# Patient Record
(demographics unavailable — no encounter records)

---

## 2025-03-18 NOTE — HISTORY OF PRESENT ILLNESS
[2] : 2 [1] : 2 [Part time] : Work status: part time [de-identified] : 11/1/22 The patient is a 55 year old female who presents today complaining of low back pain Date of Injury/Onset:  couple of weeks Pain:    At Rest:  5/10  With Activity:  4/10  Mechanism of injury:  onset Quality of symptoms: sharp stabbing Improves with:  meds Worse with:  weather Prior treatment:  GP Prior Imaging:  xray [facility] Additional Information: None  2-3 weeks of LBP.  Stabbing pain in lower back pain.  No pain radiating down legs.  Pain is worst at tailbone.  Seen by HER NP who ordered X-rays and prescribed naprosyn 500 md BID with some relief.  NO numbness, weakness, or tingling in legs.  NO fevers or chills.  No bowel or bladder symptoms.   PMH: High cholesterol PSH: None All: Penicillin  12/13/22  Pain greatly improved with PT.  Takes intermittent tylenol for relief at times.    03/18/25: Patient here today with lower back pain. Reports to having pain since 02/14/25, no specific cause of injury patient states she bent wrongly. Taking Advil if needed, experiencing stiffness in her lower back as well as numbness/tingling in her right foot. Reports to seeing a foot doctor in [place].  Restarted HEP     [] : no [FreeTextEntry1] : L-spine  [de-identified] : PT- 1-2x a week

## 2025-03-18 NOTE — IMAGING
[de-identified] : Spine: Inspection/Palpation: No tenderness to palpation throughout Cervical/thoracic/lumbar spine. No bony stepoffs, No lesions.  Gait: Non-antalgic, able to perform bilateral toe and heel rise.  Able to perform tandem gait.    Range of Motion: Lumbar Spine: Flexion to 90 degrees, Extension to 30 degrees, rotation 30 degrees bilaterally, lateral flexion to 30 degrees bilaterally.  Neurologic:  Bilateral Lower Extremities 5/5 Iliopsoas/Quadriceps/Hamstrings/ Tibialis Anterior/ Gastrocnemius. Extensor Hallucis Longus/ Flexor Hallucis Longus except    Sensation intact to light touch L2-S1  Patellar/ Achilles reflex within normal limits.   Negative straight leg raise  No pain with IR/ER/Flexion of HIps bilaterally   X-ray Ap/Lateral and obliques of lumbar spine from today were viewed and interpreted.  L4-5 spondylolisthesis   X-ray Ap/Lateral/Flexion/Extension of lumbar spine were viewed and interpreted.3/18/25   l4-5  spindyloslistheiss. Disc degne at L3-4 and l4-5, l5-s1

## 2025-03-18 NOTE — ASSESSMENT
[FreeTextEntry1] : 55 year old female with LBP that has recurred  restart PT   We will also provide a prescription for anti-inflammatories.  Discussed major side effects of medication including but not limited to gastritis and acute kidney injury.  She was instructed to take with food and to discontinue use if stomach or esophageal pain developed.